# Patient Record
(demographics unavailable — no encounter records)

---

## 2023-03-18 NOTE — MY
EXAMINATION:  Bilateral digital mammography utilizing CAD.

 

HISTORY:  Screening exam.  Comparison is made to previous studies dated 4/29/2016, 4/29/2015, 1/14/2
014, 12/7/2012.

 

FINDINGS: 

Bilateral heterogeneously dense breast tissue

 

No suspicious calcifications, masses or architectural distortions.

 

No pathologic appearing lymph nodes,  no abnormal skin thickening or nipple inversion.

 

CAD highlighted regions appear normal at this time. 

 

IMPRESSION:

BI-RADS category I - negative mammogram.    Continued screening according to ACR-ACS guidelines milton hernández.  

 

THE FALSE-NEGATIVE RATE OF MAMMOGRAM IS APPROXIMATELY 10%. MANAGEMENT OF A PALPABLE ABNORMALITY MUST
 BE BASED UPON CLINICAL GROUNDS. SENSITIVITY FOR DETECTION OF ABNORMALITIES IN DENSE BREASTS IS LOW.

 

NOTE: A letter will be sent to the patient regarding findings.

 

[facility] [place] -- [place]  [phone] - FAX  [phone]
normal...